# Patient Record
Sex: FEMALE | Race: WHITE | Employment: FULL TIME | URBAN - METROPOLITAN AREA
[De-identification: names, ages, dates, MRNs, and addresses within clinical notes are randomized per-mention and may not be internally consistent; named-entity substitution may affect disease eponyms.]

---

## 2018-09-21 ENCOUNTER — APPOINTMENT (OUTPATIENT)
Dept: GENERAL RADIOLOGY | Facility: HOSPITAL | Age: 23
End: 2018-09-21
Attending: STUDENT IN AN ORGANIZED HEALTH CARE EDUCATION/TRAINING PROGRAM
Payer: COMMERCIAL

## 2018-09-21 ENCOUNTER — HOSPITAL ENCOUNTER (EMERGENCY)
Facility: HOSPITAL | Age: 23
Discharge: HOME | End: 2018-09-21
Attending: STUDENT IN AN ORGANIZED HEALTH CARE EDUCATION/TRAINING PROGRAM
Payer: COMMERCIAL

## 2018-09-21 VITALS
BODY MASS INDEX: 24.44 KG/M2 | RESPIRATION RATE: 16 BRPM | HEIGHT: 69 IN | DIASTOLIC BLOOD PRESSURE: 73 MMHG | SYSTOLIC BLOOD PRESSURE: 118 MMHG | WEIGHT: 165 LBS | HEART RATE: 74 BPM | TEMPERATURE: 97 F | OXYGEN SATURATION: 100 %

## 2018-09-21 DIAGNOSIS — R07.9 ACUTE CHEST PAIN: Primary | ICD-10-CM

## 2018-09-21 PROCEDURE — 84484 ASSAY OF TROPONIN QUANT: CPT | Performed by: STUDENT IN AN ORGANIZED HEALTH CARE EDUCATION/TRAINING PROGRAM

## 2018-09-21 PROCEDURE — 85025 COMPLETE CBC W/AUTO DIFF WBC: CPT | Performed by: STUDENT IN AN ORGANIZED HEALTH CARE EDUCATION/TRAINING PROGRAM

## 2018-09-21 PROCEDURE — 99285 EMERGENCY DEPT VISIT HI MDM: CPT

## 2018-09-21 PROCEDURE — 80053 COMPREHEN METABOLIC PANEL: CPT | Performed by: STUDENT IN AN ORGANIZED HEALTH CARE EDUCATION/TRAINING PROGRAM

## 2018-09-21 PROCEDURE — 71046 X-RAY EXAM CHEST 2 VIEWS: CPT | Performed by: STUDENT IN AN ORGANIZED HEALTH CARE EDUCATION/TRAINING PROGRAM

## 2018-09-21 PROCEDURE — 85378 FIBRIN DEGRADE SEMIQUANT: CPT | Performed by: STUDENT IN AN ORGANIZED HEALTH CARE EDUCATION/TRAINING PROGRAM

## 2018-09-21 PROCEDURE — 93010 ELECTROCARDIOGRAM REPORT: CPT

## 2018-09-21 PROCEDURE — 96360 HYDRATION IV INFUSION INIT: CPT

## 2018-09-21 PROCEDURE — 93005 ELECTROCARDIOGRAM TRACING: CPT

## 2018-09-21 RX ORDER — ACETAMINOPHEN 500 MG
1000 TABLET ORAL ONCE
Status: COMPLETED | OUTPATIENT
Start: 2018-09-21 | End: 2018-09-21

## 2018-09-21 RX ORDER — NAPROXEN 500 MG/1
500 TABLET ORAL 2 TIMES DAILY PRN
Qty: 20 TABLET | Refills: 0 | Status: SHIPPED | OUTPATIENT
Start: 2018-09-21 | End: 2018-09-22 | Stop reason: ALTCHOICE

## 2018-09-21 RX ORDER — ASPIRIN 81 MG/1
324 TABLET, CHEWABLE ORAL ONCE
Status: COMPLETED | OUTPATIENT
Start: 2018-09-21 | End: 2018-09-21

## 2018-09-21 RX ORDER — ASPIRIN 81 MG/1
TABLET, CHEWABLE ORAL
Status: DISCONTINUED
Start: 2018-09-21 | End: 2018-09-21

## 2018-09-21 NOTE — ED PROVIDER NOTES
Patient Seen in: [facility] Emergency Department    History   Patient presents with:  Chest Pain Angina (cardiovascular)    Stated Complaint: chest pain    HPI    She is a 19-year-old female who presents emergency department reporting chest pain for normal heart sounds and intact distal pulses. Exam reveals no gallop and no friction rub. No murmur heard. Pulmonary/Chest: Effort normal. No respiratory distress. no wheezes. no rales. no tenderness. Abdominal: Soft.  Bowel sounds are normal, no di results for these tests on the individual orders. RAINBOW DRAW BLUE   RAINBOW DRAW LAVENDER   RAINBOW DRAW LIGHT GREEN   RAINBOW DRAW GOLD   CBC W/ DIFFERENTIAL     EKG    Rate, intervals and axes as noted on EKG Report.   Rate: 76  Rhythm: Sinus Rhythm

## 2018-09-22 ENCOUNTER — OFFICE VISIT (OUTPATIENT)
Dept: INTERNAL MEDICINE CLINIC | Facility: CLINIC | Age: 23
End: 2018-09-22
Payer: COMMERCIAL

## 2018-09-22 VITALS
BODY MASS INDEX: 25 KG/M2 | HEIGHT: 69 IN | HEART RATE: 71 BPM | SYSTOLIC BLOOD PRESSURE: 112 MMHG | RESPIRATION RATE: 16 BRPM | WEIGHT: 168.81 LBS | DIASTOLIC BLOOD PRESSURE: 68 MMHG

## 2018-09-22 DIAGNOSIS — K21.9 GASTROESOPHAGEAL REFLUX DISEASE, ESOPHAGITIS PRESENCE NOT SPECIFIED: ICD-10-CM

## 2018-09-22 DIAGNOSIS — R07.89 ATYPICAL CHEST PAIN: Primary | ICD-10-CM

## 2018-09-22 PROCEDURE — 99203 OFFICE O/P NEW LOW 30 MIN: CPT | Performed by: INTERNAL MEDICINE

## 2018-09-22 RX ORDER — NORGESTREL AND ETHINYL ESTRADIOL 0.3-0.03MG
1 KIT ORAL DAILY
Refills: 6 | COMMUNITY
Start: 2018-09-12 | End: 2019-02-15

## 2018-09-22 RX ORDER — OMEPRAZOLE 40 MG/1
40 CAPSULE, DELAYED RELEASE ORAL DAILY
Qty: 30 CAPSULE | Refills: 2 | Status: SHIPPED | OUTPATIENT
Start: 2018-09-22 | End: 2018-12-21

## 2018-09-22 NOTE — PROGRESS NOTES
Itzel Fernandez is a 24year old female. Patient presents with:  ER F/U: due to chest pain since thurs. has been waking up every half hour, alot of burping, abd pain more intense than chest pain,  bowel movement has changed.        HPI:     C/o intermi 16   Ht 69\"   Wt 168 lb 12.8 oz   LMP 09/14/2018   BMI 24.93 kg/m²   GENERAL: well developed, well nourished,in no apparent distress  SKIN: no rashes,no suspicious lesions  HEENT: atraumatic, normocephalic  LUNGS: normal rate without respiratory distress,

## 2018-09-24 ENCOUNTER — OFFICE VISIT (OUTPATIENT)
Dept: INTERNAL MEDICINE CLINIC | Facility: CLINIC | Age: 23
End: 2018-09-24
Payer: COMMERCIAL

## 2018-09-24 ENCOUNTER — TELEPHONE (OUTPATIENT)
Dept: INTERNAL MEDICINE CLINIC | Facility: CLINIC | Age: 23
End: 2018-09-24

## 2018-09-24 VITALS
TEMPERATURE: 98 F | DIASTOLIC BLOOD PRESSURE: 60 MMHG | BODY MASS INDEX: 24.73 KG/M2 | SYSTOLIC BLOOD PRESSURE: 102 MMHG | HEIGHT: 69 IN | HEART RATE: 62 BPM | WEIGHT: 167 LBS | RESPIRATION RATE: 14 BRPM

## 2018-09-24 DIAGNOSIS — M54.12 CERVICAL RADICULOPATHY: Primary | ICD-10-CM

## 2018-09-24 PROCEDURE — 99213 OFFICE O/P EST LOW 20 MIN: CPT | Performed by: INTERNAL MEDICINE

## 2018-09-24 NOTE — PROGRESS NOTES
Trina Claire is a 24year old female. Patient presents with: Follow - Up: AB RM 3, chest pain is gone, still having left arm pain, stiff neck       HPI:     Patient c/o neck pain radiating to left arm for several days now. Neck feels sore.  In the with neck flexion  LUNGS: normal rate without respiratory distress, lungs clear to auscultation  CARDIO: RRR nl S1 S2  NEURO: Alert and oriented, motor and sensory wnl    ASSESSMENT AND PLAN:   Cervical radiculopathy  (primary encounter diagnosis)   -check

## 2018-09-24 NOTE — TELEPHONE ENCOUNTER
Spoke with pt stating has taken Omeprazole daily as instructed with minimal improvement. Slight chest pain still. Radiating to left shoulder that started Saturday, left worse. Noted more when ambulating. 1 episode of HA. No dizziness.  1 episode of lighthea

## 2018-09-24 NOTE — TELEPHONE ENCOUNTER
Pt called and stated that she was seen on 09/22 with TB and diagnosed with acid reflux Pt states that she still has the chest pain and now it is radiating down her left arm form her shoulder to her elbow.      Call transferred to triage

## 2018-09-27 ENCOUNTER — TELEPHONE (OUTPATIENT)
Dept: INTERNAL MEDICINE CLINIC | Facility: CLINIC | Age: 23
End: 2018-09-27

## 2018-09-27 ENCOUNTER — HOSPITAL ENCOUNTER (OUTPATIENT)
Dept: GENERAL RADIOLOGY | Age: 23
Discharge: HOME | End: 2018-09-27
Attending: INTERNAL MEDICINE
Payer: COMMERCIAL

## 2018-09-27 DIAGNOSIS — M54.12 CERVICAL RADICULOPATHY: ICD-10-CM

## 2018-09-27 PROCEDURE — 72052 X-RAY EXAM NECK SPINE 6/>VWS: CPT | Performed by: INTERNAL MEDICINE

## 2018-10-09 ENCOUNTER — APPOINTMENT (OUTPATIENT)
Dept: GENERAL RADIOLOGY | Age: 23
End: 2018-10-09
Attending: FAMILY MEDICINE
Payer: COMMERCIAL

## 2018-10-09 ENCOUNTER — PRIOR ORIGINAL RECORDS (OUTPATIENT)
Dept: OTHER | Age: 23
End: 2018-10-09

## 2018-10-09 ENCOUNTER — TELEPHONE (OUTPATIENT)
Dept: INTERNAL MEDICINE CLINIC | Facility: CLINIC | Age: 23
End: 2018-10-09

## 2018-10-09 ENCOUNTER — HOSPITAL ENCOUNTER (OUTPATIENT)
Age: 23
Discharge: HOME | End: 2018-10-09
Attending: FAMILY MEDICINE
Payer: COMMERCIAL

## 2018-10-09 VITALS
WEIGHT: 167 LBS | OXYGEN SATURATION: 100 % | HEART RATE: 96 BPM | SYSTOLIC BLOOD PRESSURE: 111 MMHG | BODY MASS INDEX: 24.73 KG/M2 | RESPIRATION RATE: 14 BRPM | TEMPERATURE: 98 F | DIASTOLIC BLOOD PRESSURE: 70 MMHG | HEIGHT: 69 IN

## 2018-10-09 DIAGNOSIS — M94.0 COSTOCHONDRITIS: Primary | ICD-10-CM

## 2018-10-09 DIAGNOSIS — K59.00 CONSTIPATION, UNSPECIFIED CONSTIPATION TYPE: ICD-10-CM

## 2018-10-09 DIAGNOSIS — B34.9 VIRAL SYNDROME: ICD-10-CM

## 2018-10-09 DIAGNOSIS — J45.909 MILD REACTIVE AIRWAYS DISEASE, UNSPECIFIED WHETHER PERSISTENT: ICD-10-CM

## 2018-10-09 PROCEDURE — 99205 OFFICE O/P NEW HI 60 MIN: CPT

## 2018-10-09 PROCEDURE — 84484 ASSAY OF TROPONIN QUANT: CPT

## 2018-10-09 PROCEDURE — 36415 COLL VENOUS BLD VENIPUNCTURE: CPT

## 2018-10-09 PROCEDURE — 99215 OFFICE O/P EST HI 40 MIN: CPT

## 2018-10-09 PROCEDURE — 85378 FIBRIN DEGRADE SEMIQUANT: CPT | Performed by: FAMILY MEDICINE

## 2018-10-09 PROCEDURE — 93010 ELECTROCARDIOGRAM REPORT: CPT

## 2018-10-09 PROCEDURE — 85025 COMPLETE CBC W/AUTO DIFF WBC: CPT | Performed by: FAMILY MEDICINE

## 2018-10-09 PROCEDURE — 71046 X-RAY EXAM CHEST 2 VIEWS: CPT | Performed by: FAMILY MEDICINE

## 2018-10-09 PROCEDURE — 93005 ELECTROCARDIOGRAM TRACING: CPT

## 2018-10-09 PROCEDURE — 94640 AIRWAY INHALATION TREATMENT: CPT

## 2018-10-09 PROCEDURE — 93010 ELECTROCARDIOGRAM REPORT: CPT | Performed by: INTERNAL MEDICINE

## 2018-10-09 RX ORDER — IPRATROPIUM BROMIDE AND ALBUTEROL SULFATE 2.5; .5 MG/3ML; MG/3ML
3 SOLUTION RESPIRATORY (INHALATION) ONCE
Status: COMPLETED | OUTPATIENT
Start: 2018-10-09 | End: 2018-10-09

## 2018-10-09 RX ORDER — ALBUTEROL SULFATE 90 UG/1
2 AEROSOL, METERED RESPIRATORY (INHALATION) EVERY 4 HOURS PRN
Qty: 1 INHALER | Refills: 0 | Status: SHIPPED | OUTPATIENT
Start: 2018-10-09 | End: 2018-11-08

## 2018-10-09 NOTE — ED NOTES
Patient states she had some tingling to bilateral hands and calves to feet at the end of the albuterol but it is slowly resolving. Denies any SPENCER, SOB, but the chest pain is most prominent now to the right side. Dr. Des Kumar aware and at the bedside.

## 2018-10-09 NOTE — ED INITIAL ASSESSMENT (HPI)
The patient is here with complaints of bilateral upper chest pain that has been ongoing x 3 weeks. She has been seen by her PCP and in the ED. She has had some nausea, a dry cough, and some intermittent chills.   She has had epigastric and some low abdomi

## 2018-10-09 NOTE — TELEPHONE ENCOUNTER
Patient saw TB on 9/22 for GERD and was put on Omeprazole. Patient states that the chest pain has persisted throughout this time since being seen.   Patient states since yesterday morning chest pain became worse; patient did not sleep well last at all and

## 2018-10-09 NOTE — ED NOTES
Patient resting comfortably with a blanket, water, and tv on. She states the tingling has completely resolved but the chest pain is still the same. Again denies any SPENCER/SOB.

## 2018-10-09 NOTE — ED PROVIDER NOTES
Patient Seen in: [address]    History   Patient presents with:  Chest Pain    Stated Complaint: chest pain, nausea, light headed x3 weeks    HPI  3 weeks of symptoms   Bilateral upper chest pain X 3 weeks - appears to be r Alert and oriented to person place and time  GAIT: Normal        ED Course     Labs Reviewed   CBC W AUTO DIFF - Abnormal; Notable for the following components:       Result Value    Lymphocyte Absolute 0.84 (*)     All other components within normal limit Dispense:  1 Inhaler          Refill:  0      Magnesium Citrate Oral Solution          Sig: Take 296 mL by mouth once for 1 dose. Dispense:  296 mL          Refill:  0    Patient verbalized understanding and agreed with the plan. needed        Medications Prescribed:  Discharge Medication List as of 10/9/2018  1:42 PM    START taking these medications    Albuterol Sulfate  (90 Base) MCG/ACT Inhalation Aero Soln  Inhale 2 puffs into the lungs every 4 (four) hours as needed fo

## 2018-10-09 NOTE — TELEPHONE ENCOUNTER
Transferred to triage. Discussed w/ pt symptoms:  Chest pain becoming worse, rates 9/10, denies SOB.   States the pain kept her up most of the night so she did not sleep much and when she did get up a couple times to go to the bathroom, she felt lightheade

## 2018-10-13 ENCOUNTER — OFFICE VISIT (OUTPATIENT)
Dept: INTERNAL MEDICINE CLINIC | Facility: CLINIC | Age: 23
End: 2018-10-13
Payer: COMMERCIAL

## 2018-10-13 VITALS
DIASTOLIC BLOOD PRESSURE: 62 MMHG | HEART RATE: 64 BPM | SYSTOLIC BLOOD PRESSURE: 106 MMHG | WEIGHT: 161.63 LBS | BODY MASS INDEX: 23.94 KG/M2 | TEMPERATURE: 98 F | HEIGHT: 69 IN | RESPIRATION RATE: 16 BRPM

## 2018-10-13 DIAGNOSIS — R63.4 WEIGHT LOSS: ICD-10-CM

## 2018-10-13 DIAGNOSIS — B34.9 VIRAL SYNDROME: ICD-10-CM

## 2018-10-13 DIAGNOSIS — R12 HEARTBURN: Primary | ICD-10-CM

## 2018-10-13 PROCEDURE — 99214 OFFICE O/P EST MOD 30 MIN: CPT | Performed by: INTERNAL MEDICINE

## 2018-10-13 RX ORDER — POLYETHYLENE GLYCOL 3350 17 G/17G
17 POWDER, FOR SOLUTION ORAL
COMMUNITY
Start: 2018-10-10 | End: 2019-03-14

## 2018-10-13 NOTE — PROGRESS NOTES
Peter Montague is a 24year old female. Patient presents with:   Follow - Up: cn room 3: 3 week follow up , chest pain off and on for the last 3 weeks,  losing weight,  f/u urgent care uri      HPI:     Patient c/o burning in chest and reflux since mi OF SYSTEMS:   GENERAL HEALTH: lost weight, no fevers or chills  SKIN: denies any unusual skin lesions or rashes  RESPIRATORY: as above  CARDIOVASCULAR: denies chest pain on exertion  GI: as above  : no dysuria, hematuria  NEURO: denies focal weakness  PS

## 2018-10-16 RX ORDER — OMEPRAZOLE 40 MG/1
40 CAPSULE, DELAYED RELEASE ORAL DAILY
Qty: 30 CAPSULE | Refills: 2 | OUTPATIENT
Start: 2018-10-16 | End: 2019-01-14

## 2018-10-17 ENCOUNTER — APPOINTMENT (OUTPATIENT)
Dept: LAB | Age: 23
End: 2018-10-17
Attending: INTERNAL MEDICINE
Payer: COMMERCIAL

## 2018-10-17 DIAGNOSIS — R63.4 WEIGHT LOSS: ICD-10-CM

## 2018-10-17 PROCEDURE — 84439 ASSAY OF FREE THYROXINE: CPT

## 2018-10-17 PROCEDURE — 84443 ASSAY THYROID STIM HORMONE: CPT

## 2018-10-17 PROCEDURE — 36415 COLL VENOUS BLD VENIPUNCTURE: CPT

## 2018-10-18 ENCOUNTER — HOSPITAL ENCOUNTER (EMERGENCY)
Facility: HOSPITAL | Age: 23
Discharge: HOME | End: 2018-10-18
Attending: EMERGENCY MEDICINE
Payer: COMMERCIAL

## 2018-10-18 ENCOUNTER — PRIOR ORIGINAL RECORDS (OUTPATIENT)
Dept: OTHER | Age: 23
End: 2018-10-18

## 2018-10-18 ENCOUNTER — TELEPHONE (OUTPATIENT)
Dept: INTERNAL MEDICINE CLINIC | Facility: CLINIC | Age: 23
End: 2018-10-18

## 2018-10-18 VITALS
HEIGHT: 69 IN | OXYGEN SATURATION: 98 % | TEMPERATURE: 98 F | HEART RATE: 79 BPM | DIASTOLIC BLOOD PRESSURE: 78 MMHG | WEIGHT: 162 LBS | BODY MASS INDEX: 23.99 KG/M2 | RESPIRATION RATE: 17 BRPM | SYSTOLIC BLOOD PRESSURE: 126 MMHG

## 2018-10-18 DIAGNOSIS — R07.89 CHEST PAIN, ATYPICAL: Primary | ICD-10-CM

## 2018-10-18 PROCEDURE — 80053 COMPREHEN METABOLIC PANEL: CPT | Performed by: EMERGENCY MEDICINE

## 2018-10-18 PROCEDURE — 99283 EMERGENCY DEPT VISIT LOW MDM: CPT | Performed by: EMERGENCY MEDICINE

## 2018-10-18 PROCEDURE — 80053 COMPREHEN METABOLIC PANEL: CPT

## 2018-10-18 PROCEDURE — 83690 ASSAY OF LIPASE: CPT

## 2018-10-18 PROCEDURE — 85025 COMPLETE CBC W/AUTO DIFF WBC: CPT | Performed by: EMERGENCY MEDICINE

## 2018-10-18 PROCEDURE — 99284 EMERGENCY DEPT VISIT MOD MDM: CPT | Performed by: EMERGENCY MEDICINE

## 2018-10-18 PROCEDURE — 85025 COMPLETE CBC W/AUTO DIFF WBC: CPT

## 2018-10-18 PROCEDURE — 83690 ASSAY OF LIPASE: CPT | Performed by: EMERGENCY MEDICINE

## 2018-10-18 PROCEDURE — 81003 URINALYSIS AUTO W/O SCOPE: CPT | Performed by: EMERGENCY MEDICINE

## 2018-10-18 RX ORDER — MAGNESIUM HYDROXIDE/ALUMINUM HYDROXICE/SIMETHICONE 120; 1200; 1200 MG/30ML; MG/30ML; MG/30ML
30 SUSPENSION ORAL ONCE
Status: COMPLETED | OUTPATIENT
Start: 2018-10-18 | End: 2018-10-18

## 2018-10-18 RX ORDER — ONDANSETRON 4 MG/1
4 TABLET, FILM COATED ORAL ONCE
Status: COMPLETED | OUTPATIENT
Start: 2018-10-18 | End: 2018-10-18

## 2018-10-18 NOTE — ED INITIAL ASSESSMENT (HPI)
PT C/O GERD X 3 WEEKS, PT C/O NAUSEA AND FEELING LIGHTHEADED SINCE YESTERDAY. PT ALSO C/O ABDOMINAL PAIN SINCE LAST NIGHT.

## 2018-10-18 NOTE — TELEPHONE ENCOUNTER
Patient paged provider on call to report that she has new symptoms since seeing TB recently.   Patient has been in the Urgent Care for URI and being treated for acid reflux, has apt to see GI for EGD next week but now experiencing nausea and dizziness and t

## 2018-10-19 NOTE — ED PROVIDER NOTES
Patient Seen in: [facility] Emergency Department    History   Patient presents with:  Nausea/Vomiting/Diarrhea (gastrointestinal)  Dizziness (neurologic)    Stated Complaint: Has had GERD symptoms x 5 weeks, patient having nausea and lightheadedness that time and she has had benign EKGs x2 as well as benign chest x-rays during this particular episodes. Past Medical History:   Diagnosis Date   • Esophageal reflux        History reviewed. No pertinent surgical history.         Social History    Tobacc within normal limits   LIPASE - Normal   RAINBOW DRAW LIGHT GREEN   RAINBOW DRAW GOLD             MDM   Patient presents to the emergency department with nonspecific complaints of chest pain that has been going on since Labor Day that she states she feels [address] [facility] [id number]  [phone]    Schedule an appointment as soon as possible for a visit          Medications Prescribed:  Discharge Medication List as of 10/18/2018  9:18 PM

## 2018-11-05 ENCOUNTER — TELEPHONE (OUTPATIENT)
Dept: INTERNAL MEDICINE CLINIC | Facility: CLINIC | Age: 23
End: 2018-11-05

## 2018-11-08 ENCOUNTER — PRIOR ORIGINAL RECORDS (OUTPATIENT)
Dept: OTHER | Age: 23
End: 2018-11-08

## 2018-11-09 ENCOUNTER — PRIOR ORIGINAL RECORDS (OUTPATIENT)
Dept: OTHER | Age: 23
End: 2018-11-09

## 2018-11-09 ENCOUNTER — LAB ENCOUNTER (OUTPATIENT)
Dept: LAB | Age: 23
End: 2018-11-09
Attending: INTERNAL MEDICINE
Payer: COMMERCIAL

## 2018-11-09 DIAGNOSIS — R07.9 CHEST PAIN, UNSPECIFIED: ICD-10-CM

## 2018-11-09 DIAGNOSIS — R55 SYNCOPE AND COLLAPSE: Primary | ICD-10-CM

## 2018-11-09 DIAGNOSIS — Z82.49 FAMILY HISTORY OF ISCHEMIC HEART DISEASE: ICD-10-CM

## 2018-11-09 PROCEDURE — 36415 COLL VENOUS BLD VENIPUNCTURE: CPT

## 2018-11-09 PROCEDURE — 85652 RBC SED RATE AUTOMATED: CPT

## 2018-11-09 PROCEDURE — 83695 ASSAY OF LIPOPROTEIN(A): CPT

## 2018-11-09 PROCEDURE — 80061 LIPID PANEL: CPT

## 2018-11-09 NOTE — H&P
Ihor Schlatter  : 1995  ACCOUNT:  [id number]  [phone]  PCP: Dr. Lawrence Amish     TODAY'S DATE: 2018  DICTATED BY:  [Dr. Valarie Veliz: [chest pain.]    HPI:    [On 2018, Gera Park, a 24year old female, presented EXERCISE: no regular exercise. DIET: no special diet. MARITAL STATUS: single. OCCUPATION: .      ALLERGIES: No Known Allergies    MEDICATIONS: Selected prescriptions see below    VITAL SIGNS: [B/P - 102/72 , Pulse - 80, Weight -    1, Height does have a family history of her father dying of sudden cardiac death in his 45s. If everything is unremarkable and she still having symptoms can also consider a 24-hour Holter monitor for further evaluation.   Will base further treatment decisions after

## 2018-11-12 ENCOUNTER — HOSPITAL ENCOUNTER (OUTPATIENT)
Dept: INTERVENTIONAL RADIOLOGY/VASCULAR | Facility: HOSPITAL | Age: 23
Discharge: HOME | End: 2018-11-12
Attending: INTERNAL MEDICINE | Admitting: INTERNAL MEDICINE
Payer: COMMERCIAL

## 2018-11-12 VITALS
DIASTOLIC BLOOD PRESSURE: 66 MMHG | HEART RATE: 58 BPM | RESPIRATION RATE: 18 BRPM | BODY MASS INDEX: 23.7 KG/M2 | WEIGHT: 160 LBS | OXYGEN SATURATION: 100 % | SYSTOLIC BLOOD PRESSURE: 108 MMHG | HEIGHT: 69 IN

## 2018-11-12 DIAGNOSIS — R42 DIZZINESS: ICD-10-CM

## 2018-11-12 DIAGNOSIS — R55 SYNCOPE: ICD-10-CM

## 2018-11-12 DIAGNOSIS — R07.9 CHEST PAIN: ICD-10-CM

## 2018-11-12 PROCEDURE — 4A02XFZ MEASUREMENT OF CARDIAC RHYTHM, EXTERNAL APPROACH: ICD-10-PCS | Performed by: INTERNAL MEDICINE

## 2018-11-12 PROCEDURE — 4A03XB1 MEASUREMENT OF ARTERIAL PRESSURE, PERIPHERAL, EXTERNAL APPROACH: ICD-10-PCS | Performed by: INTERNAL MEDICINE

## 2018-11-12 PROCEDURE — 93660 TILT TABLE EVALUATION: CPT

## 2018-11-12 RX ORDER — NITROGLYCERIN 0.4 MG/1
TABLET SUBLINGUAL
Status: DISCONTINUED
Start: 2018-11-12 | End: 2018-11-12 | Stop reason: WASHOUT

## 2018-11-12 NOTE — H&P
[facility]  H&P    Trina Claire Patient Status:  Outpatient in a Bed    1995 MRN [id number]   Location [address] [facility] Attending Lazaro Pizarro MD   Hosp Day # 0 PCP Michelle Pete MD Belching, heartburn, nauseau   • Irregular bowel habits 10/9/2018    used to have one every day, then constipated, now have them   • Loss of appetite 9/20/18    Did not want food much, now eating more regularly   • Mouth sores     Sometimes appear, none ri Encounters:  11/09/18 : 160 lb (72.6 kg)  10/26/18 : 159 lb (72.1 kg)  10/18/18 : 162 lb (73.5 kg)      Telemetry: SR  General: Alert and oriented in no apparent distress. HEENT: No focal deficits. Neck: No JVD, carotids 2+ no bruits.   Cardiac: Regular r

## 2018-11-12 NOTE — PROGRESS NOTES
Tilt table at bedside with Dr Lex Casas. Positive result. Within 1-2mins of rising pt upward, pt c/o headache, feeling warm and ready to pass out. Additionally, pt wanted to vomit and feeling of tingling down both arms and hands.  Hr increased from 50's to 108 an

## 2018-11-12 NOTE — PROCEDURES
CARDIOLOGY DEPARTMENT REPORT TILT TABLE STUDY DATE:   : Reuel Mcburney, MD   INDICATION: Syncope. BRIEF HISTORY: The patient is a 24year old female  referred for a tilt table study. She  has a previous history of syncope.  Previous cardiac work up h

## 2018-11-14 ENCOUNTER — PRIOR ORIGINAL RECORDS (OUTPATIENT)
Dept: OTHER | Age: 23
End: 2018-11-14

## 2018-11-14 ENCOUNTER — HOSPITAL ENCOUNTER (OUTPATIENT)
Age: 23
Discharge: HOME | End: 2018-11-14
Attending: FAMILY MEDICINE
Payer: COMMERCIAL

## 2018-11-14 VITALS
DIASTOLIC BLOOD PRESSURE: 72 MMHG | BODY MASS INDEX: 23.7 KG/M2 | OXYGEN SATURATION: 100 % | HEIGHT: 69 IN | RESPIRATION RATE: 16 BRPM | TEMPERATURE: 98 F | WEIGHT: 160 LBS | SYSTOLIC BLOOD PRESSURE: 122 MMHG | HEART RATE: 68 BPM

## 2018-11-14 DIAGNOSIS — M79.604 LOWER EXTREMITY PAIN, POSTERIOR, RIGHT: Primary | ICD-10-CM

## 2018-11-14 LAB
ALBUMIN: 4.1 G/DL
ALKALINE PHOSPHATATE(ALK PHOS): 59 IU/L
BILIRUBIN TOTAL: 0.5 MG/DL
BUN: 8 MG/DL
CALCIUM: 9.3 MG/DL
CHLORIDE: 107 MEQ/L
CREATININE, SERUM: 0.81 MG/DL
FREE T4: 1.1 MG/DL
GLOBULIN: 4.2 G/DL
GLUCOSE: 85 MG/DL
HEMATOCRIT: 41.4 %
HEMOGLOBIN: 14.1 G/DL
PLATELETS: 278 K/UL
PROTEIN, TOTAL: 8.3 G/DL
RED BLOOD COUNT: 4.63 X 10-6/U
SGPT (ALT): 64 IU/L
SODIUM: 138 MEQ/L
THYROID STIMULATING HORMONE: 1.62 MLU/L
WHITE BLOOD COUNT: 5.3 X 10-3/U

## 2018-11-14 PROCEDURE — 85025 COMPLETE CBC W/AUTO DIFF WBC: CPT | Performed by: FAMILY MEDICINE

## 2018-11-14 PROCEDURE — 36415 COLL VENOUS BLD VENIPUNCTURE: CPT

## 2018-11-14 PROCEDURE — 80047 BASIC METABLC PNL IONIZED CA: CPT

## 2018-11-14 PROCEDURE — 99213 OFFICE O/P EST LOW 20 MIN: CPT

## 2018-11-14 PROCEDURE — 85378 FIBRIN DEGRADE SEMIQUANT: CPT | Performed by: FAMILY MEDICINE

## 2018-11-14 NOTE — ED INITIAL ASSESSMENT (HPI)
The patient is here with right leg pain that started about 1 week ago. She states the pain is primarily behind the knee and radiates up the thigh and down the calf.   She denies any changes in activity/exercise, no recent travel but did travel to [place]

## 2018-11-15 NOTE — ED PROVIDER NOTES
Patient Seen in: [facility]    History   Patient presents with:  Leg Pain    Stated Complaint: Rt Leg pain x 7 days     HPI    58-year-old female presents for right leg pain.   She states she has pain back of the knee for pa burning/stabbing/tightness/pressure pain   • Indigestion 9/20/2018    Belching, heartburn, nauseau   • Irregular bowel habits 10/9/2018    used to have one every day, then constipated, now have them   • Loss of appetite 9/20/18    Did not want food much, n (Exact Date)   SpO2 100%   BMI 23.63 kg/m²         Physical Exam   Constitutional: She is oriented to person, place, and time. She appears well-developed and well-nourished. HENT:   Head: Normocephalic and atraumatic.    Right Ear: External ear normal. Prescribed:  Discharge Medication List as of 11/14/2018  6:03 PM

## 2018-11-16 ENCOUNTER — PRIOR ORIGINAL RECORDS (OUTPATIENT)
Dept: OTHER | Age: 23
End: 2018-11-16

## 2018-11-17 ENCOUNTER — MYAURORA ACCOUNT LINK (OUTPATIENT)
Dept: OTHER | Age: 23
End: 2018-11-17

## 2018-11-17 ENCOUNTER — HOSPITAL ENCOUNTER (OUTPATIENT)
Dept: CV DIAGNOSTICS | Facility: HOSPITAL | Age: 23
Discharge: HOME | End: 2018-11-17
Attending: INTERNAL MEDICINE

## 2018-11-17 DIAGNOSIS — R55 SYNCOPE, UNSPECIFIED SYNCOPE TYPE: ICD-10-CM

## 2018-11-17 DIAGNOSIS — R07.9 CHEST PAIN, UNSPECIFIED TYPE: ICD-10-CM

## 2018-11-17 DIAGNOSIS — Z82.49 FAMILY HISTORY OF CORONARY ARTERY DISEASE: ICD-10-CM

## 2018-11-21 ENCOUNTER — PRIOR ORIGINAL RECORDS (OUTPATIENT)
Dept: OTHER | Age: 23
End: 2018-11-21

## 2018-11-30 ENCOUNTER — PRIOR ORIGINAL RECORDS (OUTPATIENT)
Dept: OTHER | Age: 23
End: 2018-11-30

## 2018-12-11 ENCOUNTER — HOSPITAL ENCOUNTER (OUTPATIENT)
Dept: CV DIAGNOSTICS | Facility: HOSPITAL | Age: 23
Discharge: HOME | End: 2018-12-11
Attending: INTERNAL MEDICINE
Payer: COMMERCIAL

## 2018-12-11 DIAGNOSIS — R55 SYNCOPE AND COLLAPSE: ICD-10-CM

## 2018-12-11 DIAGNOSIS — Z82.49 FAMILY HISTORY OF ISCHEMIC HEART DISEASE: ICD-10-CM

## 2018-12-11 DIAGNOSIS — R07.9 CHEST PAIN, UNSPECIFIED: ICD-10-CM

## 2018-12-11 PROCEDURE — 93350 STRESS TTE ONLY: CPT | Performed by: INTERNAL MEDICINE

## 2018-12-11 PROCEDURE — 93018 CV STRESS TEST I&R ONLY: CPT | Performed by: INTERNAL MEDICINE

## 2018-12-11 PROCEDURE — 93017 CV STRESS TEST TRACING ONLY: CPT | Performed by: INTERNAL MEDICINE

## 2018-12-13 ENCOUNTER — PRIOR ORIGINAL RECORDS (OUTPATIENT)
Dept: OTHER | Age: 23
End: 2018-12-13

## 2018-12-14 ENCOUNTER — PRIOR ORIGINAL RECORDS (OUTPATIENT)
Dept: OTHER | Age: 23
End: 2018-12-14

## 2018-12-18 ENCOUNTER — MYAURORA ACCOUNT LINK (OUTPATIENT)
Dept: OTHER | Age: 23
End: 2018-12-18

## 2018-12-18 ENCOUNTER — PRIOR ORIGINAL RECORDS (OUTPATIENT)
Dept: OTHER | Age: 23
End: 2018-12-18

## 2018-12-20 PROBLEM — F41.9 ANXIETY: Status: ACTIVE | Noted: 2018-12-20

## 2018-12-20 NOTE — PROGRESS NOTES
Katie Murray is a 24year old female. Patient presents with:   Follow - Up: TN Exam room 3: Follow up on cardiac testing with Dr. Brandy Arshad and endoscopy procedure with Dr. Mor Wilkerson , chest pains better, sometimes headaches      HPI:     Patient here for Abdominal pain 10/1/2018    Occasionally - can be upper or lower, sometimes on right patricia   • Anxiety 8/15/2018    since moving to new area   • Bad breath 10/1/2018    Sometimes have sour taste in mouth   • Belching 9/21/2018    Lots of hiccup-like burps an moving to new area   • Vomiting 10/9/2018    Only vomited once - after Magnesium Citrate   • Wears glasses Spring 2004    Usually wear contacts   • Weight loss 9/20/18    have lost ~8 pounds since first noticing chest pain      Social History:  Social Hist NT/ND  EXTREMITIES: no cyanosis, clubbing or edema  NEURO: Alert and oriented, no focal deficits    ASSESSMENT AND PLAN:   Anxiety  (primary encounter diagnosis)- Appears to be related to moving away from family.  Will start lexapro 5mg daily, can use alpra

## 2019-01-03 LAB
CHOLESTEROL, TOTAL: 170 MG/DL
ESR (SED RATE): 7 MM/HR
HDL CHOLESTEROL: 47 MG/DL
LDL CHOLESTEROL: 109 MG/DL
LIPOPROTEIN(A): 35 MG/DL
TRIGLYCERIDES: 70 MG/DL

## 2019-02-15 NOTE — PROGRESS NOTES
Smita Hernandez is a 24year old female. Patient presents with: Follow - Up: cn room 3: patient is here for follow up on chest discomfort       HPI:     Patient here for f/u on chest discomfort.  She was started on lexapro last OV due to chest pain r/ pain 9/21/2018    Chest pain first appeared during mean, has not gone away   • Chest pain on exertion 9/21/2018    Chest pain is constant   • Chronic cough 10/9/2018    Sometimes dry cough   • Constipation 10/9/2018   • Dizziness 10/9/2018    felt very lig apartment complex    Alcohol use:  Yes      Alcohol/week: 0.6 - 1.2 oz      Types: 1 - 2 Shots of liquor per week      Frequency: 2-4 times a month      Drinks per session: 1 or 2      Binge frequency: Never      Comment: Occasional, none since suspected GE Prescriptions     Signed Prescriptions Disp Refills   • LOW-OGESTREL 0.3-30 MG-MCG Oral Tab 90 tablet 0     Sig: Take 1 tablet by mouth daily. Imaging & Consults:  None    Return in about 8 weeks (around 4/12/2019) for physical with pap.   There are n

## 2019-02-18 ENCOUNTER — TELEPHONE (OUTPATIENT)
Dept: INTERNAL MEDICINE CLINIC | Facility: CLINIC | Age: 24
End: 2019-02-18

## 2019-02-18 DIAGNOSIS — Z00.00 ROUTINE GENERAL MEDICAL EXAMINATION AT A HEALTH CARE FACILITY: Primary | ICD-10-CM

## 2019-02-18 NOTE — TELEPHONE ENCOUNTER
Lab orders for CPE   Future Appointments   Date Time Provider Ani Okeefe   4/19/2019  9:00 AM Selene Connors MD [facility]       Lab is edward.  Pt aware to fast

## 2019-02-28 VITALS
SYSTOLIC BLOOD PRESSURE: 110 MMHG | HEART RATE: 70 BPM | DIASTOLIC BLOOD PRESSURE: 65 MMHG | WEIGHT: 156 LBS | BODY MASS INDEX: 23.11 KG/M2 | HEIGHT: 69 IN

## 2019-02-28 VITALS
SYSTOLIC BLOOD PRESSURE: 102 MMHG | DIASTOLIC BLOOD PRESSURE: 72 MMHG | HEART RATE: 80 BPM | BODY MASS INDEX: 0.15 KG/M2 | HEIGHT: 69 IN | WEIGHT: 1 LBS

## 2019-03-14 ENCOUNTER — HOSPITAL ENCOUNTER (OUTPATIENT)
Dept: ULTRASOUND IMAGING | Facility: HOSPITAL | Age: 24
Discharge: HOME | End: 2019-03-14
Attending: PHYSICIAN ASSISTANT
Payer: COMMERCIAL

## 2019-03-14 ENCOUNTER — OFFICE VISIT (OUTPATIENT)
Dept: INTERNAL MEDICINE CLINIC | Facility: CLINIC | Age: 24
End: 2019-03-14

## 2019-03-14 VITALS
SYSTOLIC BLOOD PRESSURE: 122 MMHG | WEIGHT: 158.63 LBS | BODY MASS INDEX: 23 KG/M2 | RESPIRATION RATE: 18 BRPM | HEART RATE: 80 BPM | DIASTOLIC BLOOD PRESSURE: 82 MMHG

## 2019-03-14 DIAGNOSIS — M79.661 TENDERNESS OF RIGHT CALF: ICD-10-CM

## 2019-03-14 DIAGNOSIS — M79.661 TENDERNESS OF RIGHT CALF: Primary | ICD-10-CM

## 2019-03-14 PROCEDURE — 93971 EXTREMITY STUDY: CPT | Performed by: PHYSICIAN ASSISTANT

## 2019-03-14 PROCEDURE — 99214 OFFICE O/P EST MOD 30 MIN: CPT | Performed by: PHYSICIAN ASSISTANT

## 2019-04-01 ENCOUNTER — OFFICE VISIT (OUTPATIENT)
Dept: INTERNAL MEDICINE CLINIC | Facility: CLINIC | Age: 24
End: 2019-04-01
Payer: COMMERCIAL

## 2019-04-01 VITALS
SYSTOLIC BLOOD PRESSURE: 104 MMHG | BODY MASS INDEX: 23.58 KG/M2 | HEART RATE: 76 BPM | WEIGHT: 159.19 LBS | RESPIRATION RATE: 16 BRPM | DIASTOLIC BLOOD PRESSURE: 60 MMHG | HEIGHT: 69 IN

## 2019-04-01 DIAGNOSIS — K62.5 BRBPR (BRIGHT RED BLOOD PER RECTUM): Primary | ICD-10-CM

## 2019-04-01 DIAGNOSIS — K64.8 OTHER HEMORRHOIDS: ICD-10-CM

## 2019-04-01 PROCEDURE — 99213 OFFICE O/P EST LOW 20 MIN: CPT | Performed by: INTERNAL MEDICINE

## 2019-04-01 NOTE — PROGRESS NOTES
Serafin Moreno is a 24year old female. Patient presents with:  Stool: cn room 3: patient isx here for blood in stool along with on toilet paper       HPI:     Patient c/o BRBPR for 1.5 weeks. Streaks on the stool and on the toilet paper when wiping. sleepless night   • Enlarged lymph node 10/5/2018    Throat feels swollen, up to underneath chin   • Esophageal reflux    • Fatigue    • Feeling lonely 8/15/2018    since moving to new area   • Flatulence/gas pain/belching 9/21/2018    can feel gas/bowels No    Family History   Problem Relation Age of Onset   • Heart Disorder Father    • Heart Attack Father         Age - 55, passed away from apparent heart attack   • Other (reflux) Mother    • Hypertension Mother         Diagnosed around age 61   • Other (r

## 2019-04-09 ENCOUNTER — LABORATORY ENCOUNTER (OUTPATIENT)
Dept: LAB | Age: 24
End: 2019-04-09
Attending: INTERNAL MEDICINE
Payer: COMMERCIAL

## 2019-04-09 DIAGNOSIS — Z00.00 ROUTINE GENERAL MEDICAL EXAMINATION AT A HEALTH CARE FACILITY: ICD-10-CM

## 2019-04-09 DIAGNOSIS — K62.5 BRBPR (BRIGHT RED BLOOD PER RECTUM): ICD-10-CM

## 2019-04-09 PROCEDURE — 85025 COMPLETE CBC W/AUTO DIFF WBC: CPT

## 2019-04-09 PROCEDURE — 36415 COLL VENOUS BLD VENIPUNCTURE: CPT

## 2019-05-07 PROBLEM — Z78.9 LDL-C GREATER THAN OR EQUAL TO 100 MG/DL: Status: ACTIVE | Noted: 2018-11-17

## 2019-05-07 PROBLEM — E78.41 ELEVATED LIPOPROTEIN(A): Status: ACTIVE | Noted: 2018-11-17

## 2019-05-24 ENCOUNTER — OFFICE VISIT (OUTPATIENT)
Dept: INTERNAL MEDICINE CLINIC | Facility: CLINIC | Age: 24
End: 2019-05-24

## 2019-05-24 VITALS
BODY MASS INDEX: 23.11 KG/M2 | HEIGHT: 69 IN | DIASTOLIC BLOOD PRESSURE: 56 MMHG | HEART RATE: 84 BPM | SYSTOLIC BLOOD PRESSURE: 98 MMHG | TEMPERATURE: 98 F | WEIGHT: 156 LBS

## 2019-05-24 DIAGNOSIS — Z12.4 PAP SMEAR FOR CERVICAL CANCER SCREENING: ICD-10-CM

## 2019-05-24 DIAGNOSIS — Z00.00 ROUTINE GENERAL MEDICAL EXAMINATION AT A HEALTH CARE FACILITY: Primary | ICD-10-CM

## 2019-05-24 PROCEDURE — 88175 CYTOPATH C/V AUTO FLUID REDO: CPT | Performed by: INTERNAL MEDICINE

## 2019-05-24 PROCEDURE — 99395 PREV VISIT EST AGE 18-39: CPT | Performed by: INTERNAL MEDICINE

## 2019-05-24 RX ORDER — NORGESTREL AND ETHINYL ESTRADIOL 0.3-0.03MG
1 KIT ORAL DAILY
Qty: 90 TABLET | Refills: 3 | Status: SHIPPED | OUTPATIENT
Start: 2019-05-24 | End: 2020-05-08

## 2019-05-24 NOTE — PROGRESS NOTES
Patient presents with:  Physical: LG. Room 3. She's never had a pap before and would like one today       HPI:    Patient here for cpe with pap. This would be her first pap  LMP 4/23/19, periods regular on OCP.  No vaginal or breast complaints  Sexually ac pain 9/21/2018    Chest pain first appeared during mean, has not gone away   • Chest pain on exertion 9/21/2018    Chest pain is constant   • Chronic cough 10/9/2018    Sometimes dry cough   • Constipation 10/9/2018   • Diarrhea, unspecified 4/1/2019    St Wears glasses Spring 2004    Usually wear contacts   • Weight loss 9/20/18    have lost ~8 pounds since first noticing chest pain     Past Surgical History:   Procedure Laterality Date   • EGD  12/17/2018     Family History   Problem Relation Age of Onset normal. Oropharynx is clear and moist.   Eyes: Conjunctivae and EOM are normal. PERRLA. No scleral icterus. Neck: Normal range of motion. Neck supple. Normal carotid pulses and no JVD present. No edema present. No mass and no thyromegaly present.    Su Guillen

## 2019-05-29 PROBLEM — K50.10 CROHN'S DISEASE OF LARGE INTESTINE WITHOUT COMPLICATION (HCC): Status: ACTIVE | Noted: 2019-05-29

## 2019-05-29 PROBLEM — K92.1 MELENA: Status: ACTIVE | Noted: 2019-05-29

## 2019-06-13 ENCOUNTER — LAB ENCOUNTER (OUTPATIENT)
Dept: LAB | Age: 24
End: 2019-06-13
Attending: INTERNAL MEDICINE
Payer: COMMERCIAL

## 2019-06-13 DIAGNOSIS — Z82.49 FAMILY HISTORY OF ISCHEMIC HEART DISEASE: Primary | ICD-10-CM

## 2019-06-13 DIAGNOSIS — R07.89 OTHER CHEST PAIN: ICD-10-CM

## 2019-06-13 LAB
ALBUMIN SERPL-MCNC: 3.7 G/DL
ALBUMIN/GLOB SERPL: NORMAL {RATIO}
ALP SERPL-CCNC: 56 U/L
ALT SERPL-CCNC: 45 U/L
ANION GAP SERPL CALC-SCNC: NORMAL MMOL/L
AST SERPL-CCNC: 16 U/L
BILIRUB SERPL-MCNC: 0.4 MG/DL
BUN SERPL-MCNC: 8 MG/DL
BUN/CREAT SERPL: NORMAL
CALCIUM SERPL-MCNC: 8.7 MG/DL
CHLORIDE SERPL-SCNC: 108 MMOL/L
CHOLEST SERPL-MCNC: 123 MG/DL
CHOLEST/HDLC SERPL: NORMAL {RATIO}
CO2 SERPL-SCNC: NORMAL MMOL/L
CREAT SERPL-MCNC: 0.8 MG/DL
GLOBULIN SER-MCNC: 3.7 G/DL
GLUCOSE SERPL-MCNC: 78 MG/DL
HDLC SERPL-MCNC: 53 MG/DL
LDLC SERPL CALC-MCNC: 56 MG/DL
LENGTH OF FAST TIME PATIENT: NORMAL H
LENGTH OF FAST TIME PATIENT: NORMAL H
NONHDLC SERPL-MCNC: NORMAL MG/DL
POTASSIUM SERPL-SCNC: 3.9 MMOL/L
PROT SERPL-MCNC: 7.4 G/DL
SODIUM SERPL-SCNC: 141 MMOL/L
TRIGL SERPL-MCNC: 72 MG/DL
VLDLC SERPL CALC-MCNC: NORMAL MG/DL

## 2019-06-13 PROCEDURE — 80061 LIPID PANEL: CPT

## 2019-06-13 PROCEDURE — 80053 COMPREHEN METABOLIC PANEL: CPT

## 2019-06-13 PROCEDURE — 83695 ASSAY OF LIPOPROTEIN(A): CPT

## 2019-06-13 PROCEDURE — 36415 COLL VENOUS BLD VENIPUNCTURE: CPT

## 2019-06-17 ENCOUNTER — TELEPHONE (OUTPATIENT)
Dept: CARDIOLOGY | Age: 24
End: 2019-06-17

## 2019-06-18 ENCOUNTER — CLINICAL ABSTRACT (OUTPATIENT)
Dept: CARDIOLOGY | Age: 24
End: 2019-06-18

## 2019-06-25 ENCOUNTER — OFFICE VISIT (OUTPATIENT)
Dept: CARDIOLOGY | Age: 24
End: 2019-06-25

## 2019-06-25 VITALS
SYSTOLIC BLOOD PRESSURE: 105 MMHG | HEART RATE: 75 BPM | BODY MASS INDEX: 22.66 KG/M2 | DIASTOLIC BLOOD PRESSURE: 71 MMHG | HEIGHT: 69 IN | WEIGHT: 153 LBS

## 2019-06-25 DIAGNOSIS — R42 DIZZINESS: ICD-10-CM

## 2019-06-25 DIAGNOSIS — R55 SYNCOPE, UNSPECIFIED SYNCOPE TYPE: ICD-10-CM

## 2019-06-25 DIAGNOSIS — F41.9 ANXIETY: Primary | ICD-10-CM

## 2019-06-25 DIAGNOSIS — K21.9 GASTROESOPHAGEAL REFLUX DISEASE WITHOUT ESOPHAGITIS: ICD-10-CM

## 2019-06-25 DIAGNOSIS — Z82.49 FAMILY HISTORY OF CORONARY ARTERIOSCLEROSIS: ICD-10-CM

## 2019-06-25 DIAGNOSIS — E78.00 HYPERCHOLESTEREMIA: ICD-10-CM

## 2019-06-25 DIAGNOSIS — R07.89 OTHER CHEST PAIN: ICD-10-CM

## 2019-06-25 DIAGNOSIS — E78.41 ELEVATED LIPOPROTEIN(A): ICD-10-CM

## 2019-06-25 PROCEDURE — 99214 OFFICE O/P EST MOD 30 MIN: CPT | Performed by: INTERNAL MEDICINE

## 2019-06-25 RX ORDER — MESALAMINE 4 G/60ML
4 SUSPENSION RECTAL AT BEDTIME
COMMUNITY
Start: 2019-06-24 | End: 2019-07-24

## 2019-06-25 RX ORDER — OMEPRAZOLE 40 MG/1
40 CAPSULE, DELAYED RELEASE ORAL DAILY
COMMUNITY
Start: 2018-11-08 | End: 2019-12-01 | Stop reason: ALTCHOICE

## 2019-06-25 RX ORDER — LORATADINE 10 MG/1
10 CAPSULE, LIQUID FILLED ORAL DAILY
COMMUNITY
Start: 2018-11-08 | End: 2019-12-01 | Stop reason: ALTCHOICE

## 2019-06-25 RX ORDER — ALPRAZOLAM 0.25 MG/1
0.25 TABLET ORAL PRN
COMMUNITY
Start: 2018-12-20

## 2019-06-25 RX ORDER — MESALAMINE 1.2 G/1
2 TABLET, DELAYED RELEASE ORAL DAILY
COMMUNITY
Start: 2019-06-24 | End: 2019-07-24

## 2019-06-25 SDOH — HEALTH STABILITY: PHYSICAL HEALTH: ON AVERAGE, HOW MANY DAYS PER WEEK DO YOU ENGAGE IN MODERATE TO STRENUOUS EXERCISE (LIKE A BRISK WALK)?: 2 DAYS

## 2019-06-25 SDOH — HEALTH STABILITY: PHYSICAL HEALTH: ON AVERAGE, HOW MANY MINUTES DO YOU ENGAGE IN EXERCISE AT THIS LEVEL?: 50 MIN

## 2019-07-12 ENCOUNTER — HOSPITAL ENCOUNTER (OUTPATIENT)
Dept: CT IMAGING | Facility: HOSPITAL | Age: 24
Discharge: HOME | End: 2019-07-12
Attending: INTERNAL MEDICINE
Payer: COMMERCIAL

## 2019-07-12 DIAGNOSIS — K51.511 ULCERATIVE COLITIS, LEFT SIDED, WITH RECTAL BLEEDING (HCC): ICD-10-CM

## 2019-07-12 PROCEDURE — 74177 CT ABD & PELVIS W/CONTRAST: CPT | Performed by: INTERNAL MEDICINE

## 2019-08-20 ENCOUNTER — APPOINTMENT (OUTPATIENT)
Dept: LAB | Age: 24
End: 2019-08-20
Attending: INTERNAL MEDICINE
Payer: COMMERCIAL

## 2019-08-20 PROCEDURE — 83993 ASSAY FOR CALPROTECTIN FECAL: CPT | Performed by: INTERNAL MEDICINE

## 2019-09-13 ENCOUNTER — TELEPHONE (OUTPATIENT)
Dept: INTERNAL MEDICINE CLINIC | Facility: CLINIC | Age: 24
End: 2019-09-13

## 2019-09-13 PROBLEM — K21.9 GASTROESOPHAGEAL REFLUX DISEASE: Status: RESOLVED | Noted: 2018-09-22 | Resolved: 2019-09-13

## 2019-09-13 RX ORDER — MESALAMINE 1.2 G/1
TABLET, DELAYED RELEASE ORAL
COMMUNITY
Start: 2019-06-24 | End: 2019-10-17

## 2019-09-13 NOTE — TELEPHONE ENCOUNTER
TB- ok to update problem and med list?     Problems   This clinical information was not verified, but there are updates pending review:   No Longer Applicable Problems Start Date Reported By  Comments   Gastroesophageal reflux disease 9/22/2018 Eino Kayser

## 2019-12-11 ENCOUNTER — LAB ENCOUNTER (OUTPATIENT)
Dept: LAB | Age: 24
End: 2019-12-11
Attending: INTERNAL MEDICINE
Payer: COMMERCIAL

## 2019-12-11 DIAGNOSIS — R07.89 OTHER CHEST PAIN: ICD-10-CM

## 2019-12-11 DIAGNOSIS — Z82.49 FAMILY HISTORY OF ISCHEMIC HEART DISEASE: Primary | ICD-10-CM

## 2019-12-11 PROCEDURE — 36415 COLL VENOUS BLD VENIPUNCTURE: CPT

## 2019-12-11 PROCEDURE — 80061 LIPID PANEL: CPT

## 2019-12-11 PROCEDURE — 80053 COMPREHEN METABOLIC PANEL: CPT

## 2019-12-17 ENCOUNTER — OFFICE VISIT (OUTPATIENT)
Dept: CARDIOLOGY | Age: 24
End: 2019-12-17

## 2019-12-17 ENCOUNTER — MED REC SCAN ONLY (OUTPATIENT)
Dept: INTERNAL MEDICINE CLINIC | Facility: CLINIC | Age: 24
End: 2019-12-17

## 2019-12-17 VITALS
HEART RATE: 79 BPM | HEIGHT: 69 IN | BODY MASS INDEX: 23.85 KG/M2 | DIASTOLIC BLOOD PRESSURE: 67 MMHG | WEIGHT: 161 LBS | SYSTOLIC BLOOD PRESSURE: 115 MMHG

## 2019-12-17 DIAGNOSIS — R55 SYNCOPE, UNSPECIFIED SYNCOPE TYPE: ICD-10-CM

## 2019-12-17 DIAGNOSIS — R07.89 OTHER CHEST PAIN: ICD-10-CM

## 2019-12-17 DIAGNOSIS — Z82.49 FAMILY HISTORY OF CORONARY ARTERIOSCLEROSIS: ICD-10-CM

## 2019-12-17 DIAGNOSIS — F41.9 ANXIETY: Primary | ICD-10-CM

## 2019-12-17 DIAGNOSIS — E78.00 HYPERCHOLESTEREMIA: ICD-10-CM

## 2019-12-17 DIAGNOSIS — R42 DIZZINESS: ICD-10-CM

## 2019-12-17 DIAGNOSIS — E78.41 ELEVATED LIPOPROTEIN(A): ICD-10-CM

## 2019-12-17 DIAGNOSIS — K21.9 GASTROESOPHAGEAL REFLUX DISEASE WITHOUT ESOPHAGITIS: ICD-10-CM

## 2019-12-17 PROCEDURE — 99214 OFFICE O/P EST MOD 30 MIN: CPT | Performed by: INTERNAL MEDICINE

## 2019-12-17 RX ORDER — MESALAMINE 1.2 G/1
2 TABLET, DELAYED RELEASE ORAL DAILY
COMMUNITY
Start: 2019-12-16

## 2019-12-17 SDOH — HEALTH STABILITY: PHYSICAL HEALTH: ON AVERAGE, HOW MANY DAYS PER WEEK DO YOU ENGAGE IN MODERATE TO STRENUOUS EXERCISE (LIKE A BRISK WALK)?: 0 DAYS

## 2019-12-17 SDOH — HEALTH STABILITY: PHYSICAL HEALTH: ON AVERAGE, HOW MANY MINUTES DO YOU ENGAGE IN EXERCISE AT THIS LEVEL?: 0 MIN

## 2019-12-17 ASSESSMENT — PATIENT HEALTH QUESTIONNAIRE - PHQ9
SUM OF ALL RESPONSES TO PHQ9 QUESTIONS 1 AND 2: 0
1. LITTLE INTEREST OR PLEASURE IN DOING THINGS: NOT AT ALL
SUM OF ALL RESPONSES TO PHQ9 QUESTIONS 1 AND 2: 0
1. LITTLE INTEREST OR PLEASURE IN DOING THINGS: NOT AT ALL
SUM OF ALL RESPONSES TO PHQ9 QUESTIONS 1 AND 2: 0
2. FEELING DOWN, DEPRESSED OR HOPELESS: NOT AT ALL
2. FEELING DOWN, DEPRESSED OR HOPELESS: NOT AT ALL

## 2020-05-08 RX ORDER — NORGESTREL AND ETHINYL ESTRADIOL 0.3-0.03MG
1 KIT ORAL DAILY
Qty: 84 TABLET | Refills: 1 | Status: SHIPPED | OUTPATIENT
Start: 2020-05-08

## 2020-08-20 PROBLEM — R07.89 OTHER CHEST PAIN: Status: ACTIVE | Noted: 2018-09-22

## 2020-08-20 PROBLEM — R42 DIZZINESS: Status: ACTIVE | Noted: 2018-11-08

## 2020-08-20 PROBLEM — Z82.49 FAMILY HISTORY OF CORONARY ARTERIOSCLEROSIS: Status: ACTIVE | Noted: 2018-11-08

## 2020-08-20 PROBLEM — R55 SYNCOPE: Status: ACTIVE | Noted: 2018-11-08

## 2020-08-20 PROBLEM — E78.00 HYPERCHOLESTEREMIA: Status: ACTIVE | Noted: 2019-06-25

## 2020-08-20 PROBLEM — K51.90 ULCERATIVE COLITIS (HCC): Status: ACTIVE | Noted: 2019-05-29

## 2020-08-20 PROBLEM — K21.9 ACID REFLUX: Status: ACTIVE | Noted: 2018-09-22

## 2020-08-28 ENCOUNTER — APPOINTMENT (OUTPATIENT)
Dept: LAB | Age: 25
End: 2020-08-28
Attending: INTERNAL MEDICINE
Payer: COMMERCIAL

## 2020-08-28 DIAGNOSIS — Z01.818 PRE-OP TESTING: ICD-10-CM

## 2020-08-29 LAB — SARS-COV-2 RNA RESP QL NAA+PROBE: NOT DETECTED

## 2020-12-08 ENCOUNTER — APPOINTMENT (OUTPATIENT)
Dept: CARDIOLOGY | Age: 25
End: 2020-12-08

## 2021-12-08 ENCOUNTER — LAB ENCOUNTER (OUTPATIENT)
Dept: LAB | Age: 26
End: 2021-12-08
Attending: INTERNAL MEDICINE
Payer: COMMERCIAL

## 2021-12-08 DIAGNOSIS — Z01.818 PRE-OP TESTING: ICD-10-CM

## 2021-12-11 PROBLEM — K51.50 LEFT SIDED COLITIS, WITHOUT COMPLICATIONS (HCC): Status: ACTIVE | Noted: 2021-12-11

## 2021-12-12 PROBLEM — R79.89 ABNORMAL LFTS: Status: ACTIVE | Noted: 2021-12-12

## 2021-12-12 PROBLEM — R93.3 ABNORMAL FINDING ON GI TRACT IMAGING: Status: ACTIVE | Noted: 2021-12-12

## (undated) NOTE — ED AVS SNAPSHOT
Juvencio Mcleod   MRN: [id number]    Department:  [facility] Emergency Department   Date of Visit:  9/21/2018           Disclosure     Insurance plans vary and the physician(s) referred by the ER may not be covered by your plan.  Please contact your tell this physician (or your personal doctor if your instructions are to return to your personal doctor) about any new or lasting problems. The primary care or specialist physician will see patients referred from the [facility] Emergency Department.  Kayode Gonzalez

## (undated) NOTE — LETTER
[facility] [address], [address]  Consent for Procedure/Sedation    Date:        Time:       1. I authorize the performance upon Itzel Fernandez the following:  Tilt Table Study     2.  I authorize Dr. Matteo Cool (and whomever is ________________________________    ___________________    Witness: _________________________      Date: ___________________    Printed: 2018   9:10 AM  Patient Name: Dean Field        : 1995       Medical Record #: [id number]

## (undated) NOTE — ED AVS SNAPSHOT
Jaswant Lozano   MRN: [id number]    Department:  [facility] Emergency Department   Date of Visit:  10/18/2018           Disclosure     Insurance plans vary and the physician(s) referred by the ER may not be covered by your plan.  Please contac tell this physician (or your personal doctor if your instructions are to return to your personal doctor) about any new or lasting problems. The primary care or specialist physician will see patients referred from the [facility] Emergency Department.  Yamel Watson